# Patient Record
Sex: MALE | Race: WHITE | NOT HISPANIC OR LATINO | Employment: FULL TIME | URBAN - NONMETROPOLITAN AREA
[De-identification: names, ages, dates, MRNs, and addresses within clinical notes are randomized per-mention and may not be internally consistent; named-entity substitution may affect disease eponyms.]

---

## 2020-04-10 ENCOUNTER — OFFICE VISIT (OUTPATIENT)
Dept: URGENT CARE | Facility: PHYSICIAN GROUP | Age: 25
End: 2020-04-10
Payer: COMMERCIAL

## 2020-04-10 VITALS
SYSTOLIC BLOOD PRESSURE: 122 MMHG | HEART RATE: 88 BPM | OXYGEN SATURATION: 98 % | WEIGHT: 206 LBS | DIASTOLIC BLOOD PRESSURE: 74 MMHG | TEMPERATURE: 98.9 F | HEIGHT: 71 IN | BODY MASS INDEX: 28.84 KG/M2

## 2020-04-10 DIAGNOSIS — T15.91XA FOREIGN BODY OF RIGHT EYE, INITIAL ENCOUNTER: ICD-10-CM

## 2020-04-10 PROCEDURE — 99203 OFFICE O/P NEW LOW 30 MIN: CPT | Performed by: NURSE PRACTITIONER

## 2020-04-10 SDOH — HEALTH STABILITY: MENTAL HEALTH: HOW OFTEN DO YOU HAVE A DRINK CONTAINING ALCOHOL?: NEVER

## 2020-04-10 ASSESSMENT — ENCOUNTER SYMPTOMS
BLURRED VISION: 0
PHOTOPHOBIA: 0
FEVER: 0
DOUBLE VISION: 0
EYE DISCHARGE: 0
FOREIGN BODY SENSATION: 1
HEARTBURN: 0
NAUSEA: 0
EYE ITCHING: 0
VOMITING: 0
DIZZINESS: 0
CHILLS: 0
EYE PAIN: 1
EYE REDNESS: 1
ABDOMINAL PAIN: 0

## 2020-04-10 ASSESSMENT — VISUAL ACUITY: OU: 1

## 2020-04-11 NOTE — PROGRESS NOTES
Subjective:     Agus Carmen  is a 24 y.o. male who presents for Foreign Body in Eye (x 1 day / RT eye )       Eye Problem    The right eye is affected. This is a new problem. The current episode started yesterday. The problem occurs constantly. The problem has been gradually worsening. The injury mechanism was a foreign body (Patient states he was driving with his windown down and something flew into his eye). The pain is at a severity of 5/10. The pain is moderate. There is no known exposure to pink eye. He does not wear contacts. Associated symptoms include eye redness and a foreign body sensation. Pertinent negatives include no blurred vision, eye discharge, double vision, fever, itching, nausea, photophobia, recent URI or vomiting. Associated symptoms comments: 24-year-old male reports urgent care for new problem.  States that he was driving with his window down yesterday and he felt something fly into his eye.  Upon closer examination he noticed that his eye is getting red and he noticed 2 dots in there right where his eye is irritated.  Patient denies any photophobia, dizziness, headache, nausea or vomiting.  Patient denies any changes in vision or discharge. He has tried nothing for the symptoms.     Review of Systems   Constitutional: Negative for chills, fever and malaise/fatigue.   Eyes: Positive for pain and redness. Negative for blurred vision, double vision, photophobia, discharge and itching.   Gastrointestinal: Negative for abdominal pain, heartburn, nausea and vomiting.   Neurological: Negative for dizziness.     History reviewed. No pertinent past medical history. History reviewed. No pertinent surgical history.   Social History     Socioeconomic History   • Marital status: Single     Spouse name: Not on file   • Number of children: Not on file   • Years of education: Not on file   • Highest education level: Not on file   Occupational History   • Not on file   Social Needs   •  "Financial resource strain: Not on file   • Food insecurity     Worry: Not on file     Inability: Not on file   • Transportation needs     Medical: Not on file     Non-medical: Not on file   Tobacco Use   • Smoking status: Never Smoker   • Smokeless tobacco: Never Used   • Tobacco comment: vape   Substance and Sexual Activity   • Alcohol use: Never     Frequency: Never   • Drug use: Never   • Sexual activity: Yes   Lifestyle   • Physical activity     Days per week: Not on file     Minutes per session: Not on file   • Stress: Not on file   Relationships   • Social connections     Talks on phone: Not on file     Gets together: Not on file     Attends Zoroastrian service: Not on file     Active member of club or organization: Not on file     Attends meetings of clubs or organizations: Not on file     Relationship status: Not on file   • Intimate partner violence     Fear of current or ex partner: Not on file     Emotionally abused: Not on file     Physically abused: Not on file     Forced sexual activity: Not on file   Other Topics Concern   • Not on file   Social History Narrative   • Not on file    Patient has no known allergies.     Objective:   /74   Pulse 88   Temp 37.2 °C (98.9 °F) (Temporal)   Ht 1.803 m (5' 11\")   Wt 93.4 kg (206 lb)   SpO2 98%   BMI 28.73 kg/m²   Physical Exam  Vitals signs reviewed.   Constitutional:       Appearance: Normal appearance.   Eyes:      General: Lids are normal. Vision grossly intact. Gaze aligned appropriately.         Right eye: Foreign body present. No discharge or hordeolum.      Extraocular Movements: Extraocular movements intact.      Conjunctiva/sclera:      Right eye: Right conjunctiva is injected. No exudate or hemorrhage.     Pupils: Pupils are equal, round, and reactive to light.      Visual Fields: Right eye visual fields normal and left eye visual fields normal.        Comments: No scleral or corneal abrasions appreciated   Cardiovascular:      Rate and " Rhythm: Normal rate and regular rhythm.      Heart sounds: Normal heart sounds.   Pulmonary:      Effort: Pulmonary effort is normal.      Breath sounds: Normal breath sounds.   Skin:     General: Skin is warm.   Neurological:      Mental Status: He is alert and oriented to person, place, and time.   Psychiatric:         Mood and Affect: Mood normal.         Behavior: Behavior normal.         Thought Content: Thought content normal.         Judgment: Judgment normal.          Assessment/Plan:     1. Foreign body of right eye, initial encounter    Discussed physical examination findings as well as patient presentation is consistent with a foreign body in the sclera of the eye.  Discussed with patient that I do not do foreign body removal of the eye.  Provided patient with local optometrist/ophthalmologist for further work-up and evaluation on Monday.  Also instructed patient that I did try to see if anybody was open in [place] but due to the current state of things a lot of clinics are closed.  Instructed patient that if he feels like his pain is getting worse or his vision is changing he needs to report to the emergency room for further work-up and evaluation    Supportive care, differential diagnoses, and indications for immediate follow-up discussed with patient.    Pathogenesis of diagnosis discussed including typical length and natural progression. Patient expresses understanding and agrees to plan.    Instructed patient to return to clinic for worsening symptoms or symptoms that persist for 7 to 10 days     Please note that this dictation was created using voice recognition software. I have made every reasonable attempt to correct obvious errors, but I expect that there are errors of grammar and possibly content that I did not discover before finalizing the note.

## 2022-04-27 ENCOUNTER — OFFICE VISIT (OUTPATIENT)
Dept: URGENT CARE | Facility: PHYSICIAN GROUP | Age: 27
End: 2022-04-27
Payer: COMMERCIAL

## 2022-04-27 VITALS
BODY MASS INDEX: 28.31 KG/M2 | RESPIRATION RATE: 12 BRPM | HEART RATE: 85 BPM | WEIGHT: 209 LBS | TEMPERATURE: 98.6 F | OXYGEN SATURATION: 98 % | DIASTOLIC BLOOD PRESSURE: 84 MMHG | SYSTOLIC BLOOD PRESSURE: 122 MMHG | HEIGHT: 72 IN

## 2022-04-27 DIAGNOSIS — J34.89 NOSE PAIN: ICD-10-CM

## 2022-04-27 DIAGNOSIS — L73.9 NASAL FOLLICULITIS: ICD-10-CM

## 2022-04-27 PROCEDURE — 99213 OFFICE O/P EST LOW 20 MIN: CPT | Performed by: PHYSICIAN ASSISTANT

## 2022-04-27 RX ORDER — MULTIVIT WITH MINERALS/LUTEIN
TABLET ORAL
COMMUNITY
End: 2023-02-16

## 2022-04-27 RX ORDER — SULFAMETHOXAZOLE AND TRIMETHOPRIM 800; 160 MG/1; MG/1
1 TABLET ORAL 2 TIMES DAILY
Qty: 20 TABLET | Refills: 0 | Status: SHIPPED | OUTPATIENT
Start: 2022-04-27 | End: 2022-05-02

## 2022-04-27 NOTE — PROGRESS NOTES
Subjective:   Agus Carmen is a 26 y.o. male who presents for Foreign Body in Nose (X 1 week, R nostril pt states it feels very dry, irritation and has redness all over, thinks it is an infection)   Right sided nasal pain x 1 week.  Could see some redness. Intermittent runny nose.  No obvious URI symptoms.  No fever/chills.  Mild bleeding with irritation, no epistaxis. No seasonal allergies.  No nasal steroid usage.  Did have frequent epistaxis as a kid but nothing in recent years.        Medications:  Vitamin C Tabs    Allergies:             Patient has no known allergies.    Surgical History:       No past surgical history on file.    Past Social Hx:  Agus Carmen  reports that he has never smoked. He has never used smokeless tobacco. He reports current drug use. Drug: Marijuana. He reports that he does not drink alcohol.     Past Family Hx:   Agus Carmen family history includes Diabetes in his father; Heart Disease in his father and mother.       Problem list, medications, and allergies reviewed by myself today in Epic.     Objective:     /84 (BP Location: Left arm, Patient Position: Sitting, BP Cuff Size: Adult)   Pulse 85   Temp 37 °C (98.6 °F) (Temporal)   Resp 12   Ht 1.829 m (6')   Wt 94.8 kg (209 lb)   SpO2 98%   BMI 28.35 kg/m²     Physical Exam  Vitals and nursing note reviewed.   Constitutional:       General: He is not in acute distress.     Appearance: Normal appearance. He is not ill-appearing.   HENT:      Head: Normocephalic.      Nose: Nasal tenderness and congestion present. No nasal deformity.      Right Nostril: No foreign body, epistaxis, septal hematoma or occlusion.      Right Turbinates: Enlarged and swollen.      Right Sinus: No maxillary sinus tenderness or frontal sinus tenderness.   Eyes:      Extraocular Movements: Extraocular movements intact.      Pupils: Pupils are equal, round, and reactive to light.   Cardiovascular:      Rate  and Rhythm: Normal rate.   Pulmonary:      Effort: Pulmonary effort is normal.   Skin:     General: Skin is warm.      Findings: No rash.   Neurological:      Mental Status: He is alert and oriented to person, place, and time.   Psychiatric:         Thought Content: Thought content normal.         Judgment: Judgment normal.         Assessment/Plan:     Diagnosis and Associated Orders:     1. Nose pain    2. Nasal folliculitis  - sulfamethoxazole-trimethoprim (BACTRIM DS) 800-160 MG tablet; Take 1 Tablet by mouth 2 times a day for 5 days.  Dispense: 20 Tablet; Refill: 0  - Referral to ENT            Comments/MDM:  Antibiotics as above.  Warm compresses to nares 3-5 times per day.  -May take longer acting antihistamine for seasonal allergy symptoms as needed   -May use saline nasal spray for nasal congestion or to prevent epistaxis due to nasal passage dryness or allergies  -Monitor for continued epistaxis, headache, dizziness, weakness, hematemesis- need re-evaluation immediately, must go to ER, call 911  -Treat for nasal congestion as needed: avoid strong nose blowing, use saline nasal spray to loosen, moisten and cleanse dry mucus membranes before gently bowing nose   -May use vaseline on both nostrils at least twice per day. Increase water intake, avoid touching nose too hard or picking at nose. Use a humidifier as much as possible. If worsening of symptoms, will consider referral to ENT.    Advised to follow-up with increasing pain, facial redness, signs of cellulitis, increased signs of sinus pain or pressure.  Patient demonstrates understanding.  No questions are answered.      I personally reviewed prior external notes and test results pertinent to today's visit.  Red flags discussed as well as indications to present to the Emergency Department.  Supportive care, natural history, differential diagnoses, and indications for immediate follow-up discussed.  Patient expresses understanding and agrees to plan.   Patient denies any other questions or concerns.    Follow-up with the primary care physician for recheck, reevaluation, and consideration of further management.      Please note that this dictation was created using voice recognition software. I have made a reasonable attempt to correct obvious errors, but I expect that there are errors of grammar and possibly content that I did not discover before finalizing the note.    This note was electronically signed by Courtney Salmeron PA-C

## 2023-02-01 ENCOUNTER — OFFICE VISIT (OUTPATIENT)
Dept: URGENT CARE | Facility: PHYSICIAN GROUP | Age: 28
End: 2023-02-01
Payer: COMMERCIAL

## 2023-02-01 VITALS
WEIGHT: 225 LBS | BODY MASS INDEX: 30.48 KG/M2 | TEMPERATURE: 97.9 F | DIASTOLIC BLOOD PRESSURE: 78 MMHG | RESPIRATION RATE: 18 BRPM | OXYGEN SATURATION: 96 % | HEIGHT: 72 IN | HEART RATE: 84 BPM | SYSTOLIC BLOOD PRESSURE: 120 MMHG

## 2023-02-01 DIAGNOSIS — H02.849 SWELLING OF EYELID, UNSPECIFIED LATERALITY: ICD-10-CM

## 2023-02-01 DIAGNOSIS — Z91.09 ENVIRONMENTAL ALLERGIES: ICD-10-CM

## 2023-02-01 PROCEDURE — 99213 OFFICE O/P EST LOW 20 MIN: CPT | Performed by: PHYSICIAN ASSISTANT

## 2023-02-01 RX ORDER — FLUTICASONE PROPIONATE 50 MCG
1 SPRAY, SUSPENSION (ML) NASAL DAILY
Qty: 16 G | Refills: 0 | Status: SHIPPED | OUTPATIENT
Start: 2023-02-01 | End: 2023-06-07

## 2023-02-01 RX ORDER — METHYLPREDNISOLONE 4 MG/1
4 TABLET ORAL DAILY
Qty: 21 TABLET | Refills: 0 | Status: SHIPPED | OUTPATIENT
Start: 2023-02-01 | End: 2023-02-16

## 2023-02-01 ASSESSMENT — ENCOUNTER SYMPTOMS
SORE THROAT: 0
FEVER: 0
DIARRHEA: 0
DIZZINESS: 0
NAUSEA: 0
DIAPHORESIS: 0
EYE REDNESS: 0
VOMITING: 0
EYE PAIN: 0
COUGH: 0
CONSTIPATION: 0
SINUS PAIN: 0
HEADACHES: 0
EYE DISCHARGE: 0
CHILLS: 0
ABDOMINAL PAIN: 0
SHORTNESS OF BREATH: 0
WHEEZING: 0

## 2023-02-01 NOTE — PROGRESS NOTES
Subjective:     Agus Carmen  is a 27 y.o. male who presents for Sinus Problem (Sinuses got itchy and started swelling, swollen eyes)        he presents today with sinus congestion, itchy eyes, swelling of bilateral upper and lower eyelids and mild conjunctival injection.  Symptoms have been ongoing over the last few days.  Patient notes that this is a recurrent issue for him and he does related to a possible environmental allergy.  Has been taking daily antihistamine medications over the last several weeks.  Denies swelling of the lips/tongue/oropharynx, no difficulties with breathing.  He is requesting referral to primary care to undergo allergy testing.     Review of Systems   Constitutional:  Negative for chills, diaphoresis, fever and malaise/fatigue.   HENT:  Negative for congestion, ear discharge, sinus pain and sore throat.    Eyes:  Negative for pain, discharge and redness.        Swelling of bilateral upper and lower eyelids   Respiratory:  Negative for cough, shortness of breath and wheezing.    Cardiovascular:  Negative for chest pain.   Gastrointestinal:  Negative for abdominal pain, constipation, diarrhea, nausea and vomiting.   Neurological:  Negative for dizziness and headaches.    No Known Allergies  History reviewed. No pertinent past medical history.     Objective:   /78   Pulse 84   Temp 36.6 °C (97.9 °F) (Temporal)   Resp 18   Ht 1.829 m (6')   Wt 102 kg (225 lb)   SpO2 96%   BMI 30.52 kg/m²   Physical Exam  Vitals and nursing note reviewed.   Constitutional:       General: He is not in acute distress.     Appearance: Normal appearance. He is not ill-appearing, toxic-appearing or diaphoretic.   HENT:      Head: Normocephalic.      Right Ear: Tympanic membrane, ear canal and external ear normal. There is no impacted cerumen.      Left Ear: Tympanic membrane, ear canal and external ear normal. There is no impacted cerumen.      Nose: No congestion or rhinorrhea.       Mouth/Throat:      Mouth: Mucous membranes are moist.      Pharynx: No oropharyngeal exudate or posterior oropharyngeal erythema.   Eyes:      General:         Right eye: No discharge.         Left eye: No discharge.      Extraocular Movements: Extraocular movements intact.      Pupils: Pupils are equal, round, and reactive to light.      Comments: Mild conjunctival injection bilaterally.  Mild swelling over bilateral upper and lower eyelids, no tenderness to palpation and no erythema of the swollen eyelids.   Cardiovascular:      Rate and Rhythm: Normal rate and regular rhythm.   Pulmonary:      Effort: Pulmonary effort is normal. No respiratory distress.      Breath sounds: Normal breath sounds. No stridor. No wheezing or rhonchi.   Musculoskeletal:      Cervical back: Neck supple.   Lymphadenopathy:      Cervical: No cervical adenopathy.   Neurological:      General: No focal deficit present.      Mental Status: He is alert and oriented to person, place, and time.   Psychiatric:         Mood and Affect: Mood normal.         Behavior: Behavior normal.         Thought Content: Thought content normal.         Judgment: Judgment normal.           Diagnostic testing: None    Assessment/Plan:     Encounter Diagnoses   Name Primary?    Environmental allergies     Swelling of eyelid, unspecified laterality           Plan for care for today's complaint includes Medrol Dosepak and intranasal Flonase for acute exacerbation of patient's allergies.  Continue over-the-counter oral antihistamine medication daily for additional symptom support.  Referral sent to primary care.  No evidence of angioedema or oropharynx swelling on exam today, no abnormal breath sounds on lung auscultation today.  Vital signs are stable and patient is well-appearing.  Continue to monitor symptoms and return to urgent care or follow-up with primary care provider if symptoms remain ongoing.  Follow-up in the emergency department if symptoms become  severe, ER precautions discussed in office today..  Prescription for Medrol Dosepak, intranasal Flonase provided.    See AVS Instructions below for written guidance provided to patient on after-visit management and care in addition to our verbal discussion during the visit.    Please note that this dictation was created using voice recognition software. I have attempted to correct all errors, but there may be sound-alike, spelling, grammar and possibly content errors that I did not discover before finalizing the note.    Chavez Triplett PA-C

## 2023-02-15 ENCOUNTER — HOSPITAL ENCOUNTER (OUTPATIENT)
Dept: LAB | Facility: MEDICAL CENTER | Age: 28
End: 2023-02-15
Attending: NURSE PRACTITIONER
Payer: COMMERCIAL

## 2023-02-15 ENCOUNTER — OFFICE VISIT (OUTPATIENT)
Dept: MEDICAL GROUP | Facility: PHYSICIAN GROUP | Age: 28
End: 2023-02-15
Attending: PHYSICIAN ASSISTANT
Payer: COMMERCIAL

## 2023-02-15 VITALS
TEMPERATURE: 97.9 F | BODY MASS INDEX: 32.51 KG/M2 | HEIGHT: 72 IN | HEART RATE: 72 BPM | SYSTOLIC BLOOD PRESSURE: 116 MMHG | OXYGEN SATURATION: 98 % | DIASTOLIC BLOOD PRESSURE: 70 MMHG | WEIGHT: 240 LBS

## 2023-02-15 DIAGNOSIS — Z76.89 ENCOUNTER TO ESTABLISH CARE: ICD-10-CM

## 2023-02-15 DIAGNOSIS — Z23 NEED FOR VACCINATION: ICD-10-CM

## 2023-02-15 DIAGNOSIS — T78.40XA ALLERGY, INITIAL ENCOUNTER: ICD-10-CM

## 2023-02-15 DIAGNOSIS — Z11.3 ROUTINE SCREENING FOR STI (SEXUALLY TRANSMITTED INFECTION): ICD-10-CM

## 2023-02-15 DIAGNOSIS — E66.9 OBESITY (BMI 30-39.9): ICD-10-CM

## 2023-02-15 DIAGNOSIS — Z72.0 TOBACCO CHEW USE: ICD-10-CM

## 2023-02-15 LAB
C TRACH DNA SPEC QL NAA+PROBE: NEGATIVE
HIV 1+2 AB+HIV1 P24 AG SERPL QL IA: NORMAL
N GONORRHOEA DNA SPEC QL NAA+PROBE: NEGATIVE
SPECIMEN SOURCE: NORMAL
T PALLIDUM AB SER QL IA: NORMAL

## 2023-02-15 PROCEDURE — 87491 CHLMYD TRACH DNA AMP PROBE: CPT

## 2023-02-15 PROCEDURE — 87591 N.GONORRHOEAE DNA AMP PROB: CPT

## 2023-02-15 PROCEDURE — 87389 HIV-1 AG W/HIV-1&-2 AB AG IA: CPT

## 2023-02-15 PROCEDURE — 36415 COLL VENOUS BLD VENIPUNCTURE: CPT

## 2023-02-15 PROCEDURE — 90715 TDAP VACCINE 7 YRS/> IM: CPT | Performed by: PHYSICIAN ASSISTANT

## 2023-02-15 PROCEDURE — 99213 OFFICE O/P EST LOW 20 MIN: CPT | Mod: 25 | Performed by: NURSE PRACTITIONER

## 2023-02-15 PROCEDURE — 86780 TREPONEMA PALLIDUM: CPT

## 2023-02-15 PROCEDURE — 90471 IMMUNIZATION ADMIN: CPT | Performed by: PHYSICIAN ASSISTANT

## 2023-02-15 ASSESSMENT — ENCOUNTER SYMPTOMS
EYES NEGATIVE: 1
GASTROINTESTINAL NEGATIVE: 1
PALPITATIONS: 0
SHORTNESS OF BREATH: 0
COUGH: 0
MUSCULOSKELETAL NEGATIVE: 1
CONSTITUTIONAL NEGATIVE: 1
SPUTUM PRODUCTION: 0
PSYCHIATRIC NEGATIVE: 1
FEVER: 0
NEUROLOGICAL NEGATIVE: 1

## 2023-02-15 ASSESSMENT — PATIENT HEALTH QUESTIONNAIRE - PHQ9: CLINICAL INTERPRETATION OF PHQ2 SCORE: 0

## 2023-02-15 NOTE — ASSESSMENT & PLAN NOTE
Itchy eyes, stuffy nose started around 11/2022; reports no changes in lotion, soaps, laundry detergent etc. Vapes marijuana, chews tobacco. No relief with daily claritin x2 months. Got medrol dose pack 2/1/2023, had 1 more occurrence of itchy skin since then, took benadryl. Cause still uncertain.  - switch daily antihistamine to zyrtec or allegra  - can use prn flonase for stuffy nose  - recommend vaping cessation  - referral to allergist for further testing

## 2023-02-15 NOTE — ASSESSMENT & PLAN NOTE
28 y/o healthy male here to establish care. Seems he has developed some sort of allergy the past few months, cause is unclear at this time. States he has not changed anything recently in his usual daily routine. He does vape marijuana and he will check this for changes in ingredients. Referral placed to allergist for further testing. In interim symptoms calmed down after medrol dose back; I have asked him to switch antihistamine to either zyrtec or allegra since he did not have relief with claritin. Can continue prn flonase.   He would also like STD screen this year; he is sexually active with females. No penile, urinary symptoms reported today.

## 2023-02-15 NOTE — PROGRESS NOTES
Subjective       CC:    Chief Complaint   Patient presents with    Establish Care     Referral to allergy, routine blood work.         HISTORY OF THE PRESENT ILLNESS: Patient is a 27 y.o. male, here today to establish care. Prior PCP was none in the recent past. He reports no chronic medical history and is not currently on prescription medication. Relocated from [place] in 2019. He works with facility maintenance.     Active concern today is since 11/2022, he has noticed off/on itchy eyes, stuffy nose. He tried daily claritin for at least 2 months with no relief. He was seen in  on 2/1/2023 & completed steroid pack. Since then has had only 1 occurrence of itchy skin and he took benadryl for this. He would like to see allergist for further testing. We reviewed possible causes of allergic reactions including soaps, lotions, laundry detergents, pets, food. States he had no changes in these items prior to reaction. He does vape marijuana and chew tobacco. He will check these products to see if any changes in ingredients. Counseled today on complete cessation of vaping, tobacco use.     The below problems were discussed/reviewed at this visit:    Problem   Obesity (Bmi 30-39.9)   Tobacco Chew Use   Encounter to Establish Care   Allergies       Patient Active Problem List   Diagnosis    Obesity (BMI 30-39.9)    Tobacco chew use    Encounter to establish care    Allergies       History reviewed. No pertinent past medical history.     Current Outpatient Medications Ordered in Epic   Medication Sig Dispense Refill    fluticasone (FLONASE) 50 MCG/ACT nasal spray Administer 1 Spray into affected nostril(S) every day. 16 g 0    methylPREDNISolone (MEDROL DOSEPAK) 4 MG Tablet Therapy Pack Take 1 Tablet by mouth every day. Follow schedule on package instructions. (Patient not taking: Reported on 2/15/2023) 21 Tablet 0    Ascorbic Acid (VITAMIN C) 1000 MG Tab Take  by mouth. (Patient not taking: Reported on 2/15/2023)       No  current Epic-ordered facility-administered medications on file.        History reviewed. No pertinent surgical history.     Allergies:  Patient has no known allergies.    Health Maintenance: Completed  - he will get vaccine records from mom    ROS:   Review of Systems   Constitutional: Negative.  Negative for fever and malaise/fatigue.   HENT: Negative.     Eyes: Negative.    Respiratory:  Negative for cough, sputum production and shortness of breath.    Cardiovascular:  Negative for chest pain, palpitations and leg swelling.   Gastrointestinal: Negative.    Genitourinary: Negative.    Musculoskeletal: Negative.    Neurological: Negative.    Endo/Heme/Allergies: Negative.    Psychiatric/Behavioral: Negative.         Objective       Exam: /70   Pulse 72   Temp 36.6 °C (97.9 °F) (Tympanic)   Ht 1.829 m (6')   Wt 109 kg (240 lb)   SpO2 98%  Body mass index is 32.55 kg/m².    Physical Exam  Constitutional:       Appearance: Normal appearance.   Eyes:      Extraocular Movements: Extraocular movements intact.      Conjunctiva/sclera: Conjunctivae normal.      Pupils: Pupils are equal, round, and reactive to light.   Cardiovascular:      Rate and Rhythm: Normal rate and regular rhythm.      Pulses: Normal pulses.      Heart sounds: Normal heart sounds.   Pulmonary:      Effort: Pulmonary effort is normal.      Breath sounds: Normal breath sounds.   Musculoskeletal:         General: Normal range of motion.      Cervical back: Normal range of motion and neck supple.      Right lower leg: No edema.      Left lower leg: No edema.   Skin:     General: Skin is warm and dry.   Neurological:      General: No focal deficit present.      Mental Status: He is alert and oriented to person, place, and time.   Psychiatric:         Mood and Affect: Mood normal.         Behavior: Behavior normal.         Thought Content: Thought content normal.         Judgment: Judgment normal.       Assessment & Plan     27 y.o. male with the  following -    Problem List Items Addressed This Visit       Obesity (BMI 30-39.9)     BMI 32.55  - he is active at work as maintenance  - he bikes with family in the summer months  - we discussed incorporating some exercise in the winter months as well for cardiovascular health         Relevant Orders    Patient identified as having weight management issue.  Appropriate orders and counseling given.    Tobacco chew use     Chewing tobacco since 2018; also vapes marijuana; smoked cigarettes in the past for a short while <1 year.  - counseled on cessation of tobacco use.   - risk of oral cancer reviewed with patient; recommend he get oral exam with dentist and teeth cleaning every 6-12 months         Encounter to establish care     26 y/o healthy male here to establish care. Seems he has developed some sort of allergy the past few months, cause is unclear at this time. States he has not changed anything recently in his usual daily routine. He does vape marijuana and he will check this for changes in ingredients. Referral placed to allergist for further testing. In interim symptoms calmed down after medrol dose back; I have asked him to switch antihistamine to either zyrtec or allegra since he did not have relief with claritin. Can continue prn flonase.   He would also like STD screen this year; he is sexually active with females. No penile, urinary symptoms reported today.          Allergies     Itchy eyes, stuffy nose started around 11/2022; reports no changes in lotion, soaps, laundry detergent etc. Vapes marijuana, chews tobacco. No relief with daily claritin x2 months. Got medrol dose pack 2/1/2023, had 1 more occurrence of itchy skin since then, took benadryl. Cause still uncertain.  - switch daily antihistamine to zyrtec or allegra  - can use prn flonase for stuffy nose  - recommend vaping cessation  - referral to allergist for further testing         Relevant Orders    Referral to Allergy     Other Visit  Diagnoses       Routine screening for STI (sexually transmitted infection)        Relevant Orders    Chlamydia/GC, PCR (Urine)    HIV AG/AB COMBO ASSAY SCREENING    T.PALLIDUM AB CARLOS (SCREENING)    Need for vaccination        I have placed the below orders and discussed them with an approved delegating provider.The MA is performing the below orders under the direction of Dr Cardozo.    Relevant Orders    Tdap Vaccine =>6YO IM          Educated in proper administration of medication(s) ordered today including safety, possible SE, risks, benefits, rationale and alternatives to therapy.     Return in about 6 months (around 8/15/2023) for Annual Visit.    Please note that this dictation was created using voice recognition software. I have made every reasonable attempt to correct obvious errors, but I expect that there are errors of grammar and possibly content that I did not discover before finalizing the note.

## 2023-02-15 NOTE — ASSESSMENT & PLAN NOTE
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

## 2023-02-15 NOTE — ASSESSMENT & PLAN NOTE
Chewing tobacco since 2018; also vapes marijuana; smoked cigarettes in the past for a short while <1 year.  - counseled on cessation of tobacco use.   - risk of oral cancer reviewed with patient; recommend he get oral exam with dentist and teeth cleaning every 6-12 months

## 2023-03-18 ENCOUNTER — HOSPITAL ENCOUNTER (OUTPATIENT)
Dept: LAB | Facility: MEDICAL CENTER | Age: 28
End: 2023-03-18
Attending: INTERNAL MEDICINE
Payer: COMMERCIAL

## 2023-03-18 LAB
ALBUMIN SERPL BCP-MCNC: 4.6 G/DL (ref 3.2–4.9)
ALBUMIN/GLOB SERPL: 1.4 G/DL
ALP SERPL-CCNC: 83 U/L (ref 30–99)
ALT SERPL-CCNC: 50 U/L (ref 2–50)
ANION GAP SERPL CALC-SCNC: 10 MMOL/L (ref 7–16)
AST SERPL-CCNC: 33 U/L (ref 12–45)
BASOPHILS # BLD AUTO: 0.5 % (ref 0–1.8)
BASOPHILS # BLD: 0.03 K/UL (ref 0–0.12)
BILIRUB SERPL-MCNC: 0.8 MG/DL (ref 0.1–1.5)
BUN SERPL-MCNC: 12 MG/DL (ref 8–22)
CALCIUM ALBUM COR SERPL-MCNC: 9.3 MG/DL (ref 8.5–10.5)
CALCIUM SERPL-MCNC: 9.8 MG/DL (ref 8.5–10.5)
CHLORIDE SERPL-SCNC: 104 MMOL/L (ref 96–112)
CO2 SERPL-SCNC: 25 MMOL/L (ref 20–33)
CREAT SERPL-MCNC: 1.13 MG/DL (ref 0.5–1.4)
EOSINOPHIL # BLD AUTO: 0.14 K/UL (ref 0–0.51)
EOSINOPHIL NFR BLD: 2.4 % (ref 0–6.9)
ERYTHROCYTE [DISTWIDTH] IN BLOOD BY AUTOMATED COUNT: 38.5 FL (ref 35.9–50)
GFR SERPLBLD CREATININE-BSD FMLA CKD-EPI: 92 ML/MIN/1.73 M 2
GLOBULIN SER CALC-MCNC: 3.2 G/DL (ref 1.9–3.5)
GLUCOSE SERPL-MCNC: 99 MG/DL (ref 65–99)
HCT VFR BLD AUTO: 48.1 % (ref 42–52)
HGB BLD-MCNC: 16.3 G/DL (ref 14–18)
IMM GRANULOCYTES # BLD AUTO: 0.03 K/UL (ref 0–0.11)
IMM GRANULOCYTES NFR BLD AUTO: 0.5 % (ref 0–0.9)
LYMPHOCYTES # BLD AUTO: 2.03 K/UL (ref 1–4.8)
LYMPHOCYTES NFR BLD: 34.5 % (ref 22–41)
MCH RBC QN AUTO: 28.4 PG (ref 27–33)
MCHC RBC AUTO-ENTMCNC: 33.9 G/DL (ref 33.7–35.3)
MCV RBC AUTO: 83.9 FL (ref 81.4–97.8)
MONOCYTES # BLD AUTO: 0.64 K/UL (ref 0–0.85)
MONOCYTES NFR BLD AUTO: 10.9 % (ref 0–13.4)
NEUTROPHILS # BLD AUTO: 3.02 K/UL (ref 1.82–7.42)
NEUTROPHILS NFR BLD: 51.2 % (ref 44–72)
NRBC # BLD AUTO: 0 K/UL
NRBC BLD-RTO: 0 /100 WBC
PLATELET # BLD AUTO: 303 K/UL (ref 164–446)
PMV BLD AUTO: 9.8 FL (ref 9–12.9)
POTASSIUM SERPL-SCNC: 4.4 MMOL/L (ref 3.6–5.5)
PROT SERPL-MCNC: 7.8 G/DL (ref 6–8.2)
RBC # BLD AUTO: 5.73 M/UL (ref 4.7–6.1)
SODIUM SERPL-SCNC: 139 MMOL/L (ref 135–145)
TSH SERPL DL<=0.005 MIU/L-ACNC: 2.22 UIU/ML (ref 0.38–5.33)
WBC # BLD AUTO: 5.9 K/UL (ref 4.8–10.8)

## 2023-03-18 PROCEDURE — 83520 IMMUNOASSAY QUANT NOS NONAB: CPT

## 2023-03-18 PROCEDURE — 85025 COMPLETE CBC W/AUTO DIFF WBC: CPT

## 2023-03-18 PROCEDURE — 80053 COMPREHEN METABOLIC PANEL: CPT

## 2023-03-18 PROCEDURE — 84443 ASSAY THYROID STIM HORMONE: CPT

## 2023-03-18 PROCEDURE — 36415 COLL VENOUS BLD VENIPUNCTURE: CPT

## 2023-03-21 LAB — TRYPTASE SERPL-MCNC: 5.5 UG/L

## 2023-06-07 ENCOUNTER — OFFICE VISIT (OUTPATIENT)
Dept: URGENT CARE | Facility: PHYSICIAN GROUP | Age: 28
End: 2023-06-07
Payer: COMMERCIAL

## 2023-06-07 VITALS
HEIGHT: 72 IN | OXYGEN SATURATION: 97 % | WEIGHT: 230 LBS | TEMPERATURE: 98.1 F | RESPIRATION RATE: 16 BRPM | HEART RATE: 90 BPM | BODY MASS INDEX: 31.15 KG/M2 | SYSTOLIC BLOOD PRESSURE: 126 MMHG | DIASTOLIC BLOOD PRESSURE: 72 MMHG

## 2023-06-07 DIAGNOSIS — J30.9 ALLERGIC RHINITIS, UNSPECIFIED SEASONALITY, UNSPECIFIED TRIGGER: ICD-10-CM

## 2023-06-07 DIAGNOSIS — H57.89 EYE SWELLING: ICD-10-CM

## 2023-06-07 DIAGNOSIS — H57.89 EYE IRRITATION: ICD-10-CM

## 2023-06-07 PROCEDURE — 3074F SYST BP LT 130 MM HG: CPT | Performed by: NURSE PRACTITIONER

## 2023-06-07 PROCEDURE — 3078F DIAST BP <80 MM HG: CPT | Performed by: NURSE PRACTITIONER

## 2023-06-07 PROCEDURE — 99214 OFFICE O/P EST MOD 30 MIN: CPT | Performed by: NURSE PRACTITIONER

## 2023-06-07 RX ORDER — METHYLPREDNISOLONE 4 MG/1
TABLET ORAL
Qty: 1 EACH | Refills: 0 | Status: SHIPPED | OUTPATIENT
Start: 2023-06-07 | End: 2023-08-22

## 2023-06-07 ASSESSMENT — ENCOUNTER SYMPTOMS
EYE DISCHARGE: 0
BLURRED VISION: 0
CONSTITUTIONAL NEGATIVE: 1
EYE REDNESS: 0
FEVER: 0
EYE ITCHING: 1

## 2023-06-07 ASSESSMENT — VISUAL ACUITY: OU: 1

## 2023-06-07 ASSESSMENT — FIBROSIS 4 INDEX: FIB4 SCORE: 0.39

## 2023-06-07 NOTE — LETTER
June 7, 2023         Patient: Agus Carmen   YOB: 1995   Date of Visit: 6/7/2023           To Whom it May Concern:    Agus Carmen was seen in my clinic on 6/7/2023 due to illness. Due to medical necessity, please excuse patient from work 6/7 and 6/8.     If you have any questions or concerns, please don't hesitate to call.        Sincerely,         Kike Soto A.P.R.N.  Electronically Signed

## 2023-06-07 NOTE — PROGRESS NOTES
Subjective:     Agus Carmen is a 27 y.o. male who presents for Eye Problem (BILATERAL EYE IRRITATION AND SWELLING X 1 YEAR )       Eye Problem   Both eyes are affected. This is a chronic problem. The problem has been waxing and waning. There was no injury mechanism. The pain is mild. Associated symptoms include itching. Pertinent negatives include no blurred vision, eye discharge, eye redness or fever.     Patient reports chronic, recurrent bilateral eye itching, irritation, and surrounding swelling.    Has also had sneezing, nasal congestion, and pruritus. Has taken Allegra which seems to help with his skin itching.  However, would reoccur when he stops it.    Tried seeing allergist.  Reports having had blood work.  Informed that results were unremarkable and he was advised to continue with OTC antihistamine.    Sees PCP.    Review of Systems   Constitutional: Negative.  Negative for fever.   HENT:  Positive for congestion.    Eyes:  Positive for itching. Negative for blurred vision, discharge and redness.        Irritation   Skin:  Positive for itching.   All other systems reviewed and are negative.    Refer to HPI for additional details.    During this visit, appropriate PPE was worn, and hand hygiene was performed.    PMH:  has no past medical history on file.    MEDS:   Current Outpatient Medications:     methylPREDNISolone (MEDROL DOSEPAK) 4 MG Tablet Therapy Pack, Use as directed on package. May take all of Day 1 as a single dose (24 mg) when starting., Disp: 1 Each, Rfl: 0    ALLERGIES: No Known Allergies  SURGHX: History reviewed. No pertinent surgical history.  SOCHX:  reports that he has never smoked. His smokeless tobacco use includes chew. He reports current alcohol use. He reports current drug use. Drugs: Marijuana and Inhaled.    FH: Per HPI as applicable/pertinent.      Objective:     /72   Pulse 90   Temp 36.7 °C (98.1 °F) (Temporal)   Resp 16   Ht 1.829 m (6')   Wt 104 kg  (230 lb)   SpO2 97%   BMI 31.19 kg/m²     Physical Exam  Nursing note reviewed.   Constitutional:       General: He is not in acute distress.     Appearance: He is well-developed. He is not ill-appearing or toxic-appearing.   Eyes:      General: Vision grossly intact.         Right eye: No discharge.         Left eye: No discharge.      Extraocular Movements: Extraocular movements intact.      Conjunctiva/sclera: Conjunctivae normal.      Right eye: Right conjunctiva is not injected. No chemosis.     Left eye: Left conjunctiva is not injected.      Pupils: Pupils are equal, round, and reactive to light.      Comments: Minimal swelling below left e ye   Cardiovascular:      Rate and Rhythm: Normal rate.   Pulmonary:      Effort: Pulmonary effort is normal. No respiratory distress.   Musculoskeletal:         General: No deformity. Normal range of motion.   Skin:     Coloration: Skin is not pale.   Neurological:      Mental Status: He is alert and oriented to person, place, and time.      Motor: No weakness.   Psychiatric:         Behavior: Behavior normal. Behavior is cooperative.       Assessment/Plan:     1. Eye swelling  - methylPREDNISolone (MEDROL DOSEPAK) 4 MG Tablet Therapy Pack; Use as directed on package. May take all of Day 1 as a single dose (24 mg) when starting.  Dispense: 1 Each; Refill: 0  - Referral to Ophthalmology    2. Eye irritation  - methylPREDNISolone (MEDROL DOSEPAK) 4 MG Tablet Therapy Pack; Use as directed on package. May take all of Day 1 as a single dose (24 mg) when starting.  Dispense: 1 Each; Refill: 0  - Referral to Ophthalmology    3. Allergic rhinitis, unspecified seasonality, unspecified trigger  - methylPREDNISolone (MEDROL DOSEPAK) 4 MG Tablet Therapy Pack; Use as directed on package. May take all of Day 1 as a single dose (24 mg) when starting.  Dispense: 1 Each; Refill: 0    Chronic, recurrent problem. Symptoms likely related to allergies. Rx as above sent electronically. Trial  of oral steroids. Monitor. Follow up ophthalmology for further evaluation; referral placed. Follow up with PCP.    Differential diagnosis, natural history, supportive care, over-the-counter symptom management per 's instructions, close monitoring, and indications for immediate follow-up discussed.     All questions answered. Patient agrees with the plan of care.    Work note provided.

## 2023-06-20 ENCOUNTER — PATIENT MESSAGE (OUTPATIENT)
Dept: MEDICAL GROUP | Facility: PHYSICIAN GROUP | Age: 28
End: 2023-06-20
Payer: COMMERCIAL

## 2023-06-20 DIAGNOSIS — T78.40XA ALLERGY, INITIAL ENCOUNTER: ICD-10-CM

## 2023-06-21 NOTE — PROGRESS NOTES
1. Allergy, initial encounter  Referral resubmitted for another group per patient request  - Referral to Allergy

## 2023-06-30 ENCOUNTER — HOSPITAL ENCOUNTER (OUTPATIENT)
Dept: LAB | Facility: MEDICAL CENTER | Age: 28
End: 2023-06-30
Attending: NURSE PRACTITIONER
Payer: COMMERCIAL

## 2023-06-30 ENCOUNTER — OFFICE VISIT (OUTPATIENT)
Dept: MEDICAL GROUP | Facility: PHYSICIAN GROUP | Age: 28
End: 2023-06-30
Payer: COMMERCIAL

## 2023-06-30 VITALS
SYSTOLIC BLOOD PRESSURE: 116 MMHG | HEART RATE: 80 BPM | HEIGHT: 72 IN | RESPIRATION RATE: 16 BRPM | OXYGEN SATURATION: 99 % | WEIGHT: 226 LBS | TEMPERATURE: 97 F | DIASTOLIC BLOOD PRESSURE: 72 MMHG | BODY MASS INDEX: 30.61 KG/M2

## 2023-06-30 DIAGNOSIS — R00.0 TACHYCARDIA: ICD-10-CM

## 2023-06-30 LAB
ALBUMIN SERPL BCP-MCNC: 5 G/DL (ref 3.2–4.9)
ALBUMIN/GLOB SERPL: 1.7 G/DL
ALP SERPL-CCNC: 102 U/L (ref 30–99)
ALT SERPL-CCNC: 38 U/L (ref 2–50)
ANION GAP SERPL CALC-SCNC: 8 MMOL/L (ref 7–16)
AST SERPL-CCNC: 30 U/L (ref 12–45)
BILIRUB SERPL-MCNC: 0.6 MG/DL (ref 0.1–1.5)
BUN SERPL-MCNC: 13 MG/DL (ref 8–22)
CALCIUM ALBUM COR SERPL-MCNC: 9.2 MG/DL (ref 8.5–10.5)
CALCIUM SERPL-MCNC: 10 MG/DL (ref 8.5–10.5)
CHLORIDE SERPL-SCNC: 102 MMOL/L (ref 96–112)
CO2 SERPL-SCNC: 27 MMOL/L (ref 20–33)
CREAT SERPL-MCNC: 1.06 MG/DL (ref 0.5–1.4)
ERYTHROCYTE [DISTWIDTH] IN BLOOD BY AUTOMATED COUNT: 37.6 FL (ref 35.9–50)
GFR SERPLBLD CREATININE-BSD FMLA CKD-EPI: 99 ML/MIN/1.73 M 2
GLOBULIN SER CALC-MCNC: 2.9 G/DL (ref 1.9–3.5)
GLUCOSE SERPL-MCNC: 98 MG/DL (ref 65–99)
HCT VFR BLD AUTO: 49.5 % (ref 42–52)
HGB BLD-MCNC: 16.6 G/DL (ref 14–18)
MCH RBC QN AUTO: 28.8 PG (ref 27–33)
MCHC RBC AUTO-ENTMCNC: 33.5 G/DL (ref 32.3–36.5)
MCV RBC AUTO: 85.9 FL (ref 81.4–97.8)
PLATELET # BLD AUTO: 294 K/UL (ref 164–446)
PMV BLD AUTO: 9.4 FL (ref 9–12.9)
POTASSIUM SERPL-SCNC: 4.8 MMOL/L (ref 3.6–5.5)
PROT SERPL-MCNC: 7.9 G/DL (ref 6–8.2)
RBC # BLD AUTO: 5.76 M/UL (ref 4.7–6.1)
SODIUM SERPL-SCNC: 137 MMOL/L (ref 135–145)
TROPONIN T SERPL-MCNC: <6 NG/L (ref 6–19)
WBC # BLD AUTO: 5.5 K/UL (ref 4.8–10.8)

## 2023-06-30 PROCEDURE — 93000 ELECTROCARDIOGRAM COMPLETE: CPT | Performed by: NURSE PRACTITIONER

## 2023-06-30 PROCEDURE — 80307 DRUG TEST PRSMV CHEM ANLYZR: CPT

## 2023-06-30 PROCEDURE — 84484 ASSAY OF TROPONIN QUANT: CPT

## 2023-06-30 PROCEDURE — 99214 OFFICE O/P EST MOD 30 MIN: CPT | Performed by: NURSE PRACTITIONER

## 2023-06-30 PROCEDURE — 80053 COMPREHEN METABOLIC PANEL: CPT

## 2023-06-30 PROCEDURE — 3078F DIAST BP <80 MM HG: CPT | Performed by: NURSE PRACTITIONER

## 2023-06-30 PROCEDURE — 36415 COLL VENOUS BLD VENIPUNCTURE: CPT

## 2023-06-30 PROCEDURE — 3074F SYST BP LT 130 MM HG: CPT | Performed by: NURSE PRACTITIONER

## 2023-06-30 PROCEDURE — 85027 COMPLETE CBC AUTOMATED: CPT

## 2023-06-30 ASSESSMENT — ENCOUNTER SYMPTOMS
FEVER: 0
NEUROLOGICAL NEGATIVE: 1
COUGH: 0
SPUTUM PRODUCTION: 0
CONSTITUTIONAL NEGATIVE: 1
GASTROINTESTINAL NEGATIVE: 1
PSYCHIATRIC NEGATIVE: 1
MUSCULOSKELETAL NEGATIVE: 1
PALPITATIONS: 1
EYES NEGATIVE: 1
SHORTNESS OF BREATH: 0

## 2023-06-30 ASSESSMENT — FIBROSIS 4 INDEX: FIB4 SCORE: 0.39

## 2023-06-30 NOTE — PROGRESS NOTES
Subjective       CC:   Chief Complaint   Patient presents with    Tachycardia     Rest, heart feels weird, past couple days, heart rate shot up to 220 BPM, Chest pain, feels like pressure on it, denies stress        HPI:   Patient is a 27 y.o. established male patient with medical history listed below here today with new concern of feeling flushed with lintermittent eft side chest pain for a few months. He got a heart monitor watch which shows 120-140 during the day, sometimes it goes as low as 40s. Seems to feel better with activity. He went hiking on Sunday and felt great, highest HR during incline climb was in 130s. No new stressors, he does not feel anxious. Denies SOB, dyspnea, dizziness, edema. He does not smoke cigarettes but does chew tobacco. He quit vaping marijuana months ago.     Patient Active Problem List   Diagnosis    Obesity (BMI 30-39.9)    Tobacco chew use    Encounter to establish care    Allergies    Tachycardia       History reviewed. No pertinent past medical history.     History reviewed. No pertinent surgical history.     Current Outpatient Medications on File Prior to Visit   Medication Sig Dispense Refill    methylPREDNISolone (MEDROL DOSEPAK) 4 MG Tablet Therapy Pack Use as directed on package. May take all of Day 1 as a single dose (24 mg) when starting. 1 Each 0     No current facility-administered medications on file prior to visit.        ROS:  Review of Systems   Constitutional: Negative.  Negative for fever and malaise/fatigue.   HENT: Negative.     Eyes: Negative.    Respiratory:  Negative for cough, sputum production and shortness of breath.    Cardiovascular:  Positive for chest pain and palpitations. Negative for leg swelling.   Gastrointestinal: Negative.    Genitourinary: Negative.    Musculoskeletal: Negative.    Neurological: Negative.    Endo/Heme/Allergies: Negative.    Psychiatric/Behavioral: Negative.         Objective       Exam:  /72   Pulse 80   Temp 36.1 °C  (97 °F) (Temporal)   Resp 16   Ht 1.829 m (6')   Wt 103 kg (226 lb)   SpO2 99%   BMI 30.65 kg/m²  Body mass index is 30.65 kg/m².    Physical Exam  Constitutional:       Appearance: Normal appearance.   Cardiovascular:      Rate and Rhythm: Normal rate and regular rhythm.      Pulses: Normal pulses.      Heart sounds: Normal heart sounds.   Pulmonary:      Effort: Pulmonary effort is normal.      Breath sounds: Normal breath sounds.   Musculoskeletal:      Right lower leg: No edema.      Left lower leg: No edema.   Skin:     General: Skin is warm and dry.   Neurological:      General: No focal deficit present.      Mental Status: He is alert and oriented to person, place, and time.       EKG Interpretation  Interpreted by myself    Measurements    QRSD  91  QT  396  QTc 422    Axis  P  23  QRS  52  T  19    Rhythm: normal sinus   Rate: 68  Axis: normal  Ectopy: premature atrial contraction  Conduction: prolonged CA interval  ST Segments: no acute change and normal  T Waves: no acute change and normal  Q Waves: none    Clinical Impression: no acute changes, SR, PAC, prolonged CA interval      Assessment & Plan       27 y.o. male with the following -     Problem List Items Addressed This Visit       Tachycardia     Reports feeling flushed with intermittent left side chest pressure for a few months. He got a heart monitor watch some days ago which shows -140 during the day, sometimes it goes as low as 40s. Seems to feel better with activity. He went hiking on Sunday and felt great, highest HR during incline climb was in 130s. No new stressors, he does not feel anxious. Denies cough, SOB, dyspnea, dizziness, edema. No abdominal pain, N/V/D. He does not smoke cigarettes but does chew tobacco. He quit vaping marijuana months ago when symptoms started but has not noticed improvement.   BS clear on exam, no murmur. BP today 116/72, HR 80; pt does not appear in distress. He does not have symptoms at this  moment.   - EKG today SR with PAC & prolonged CA interval, rate 68; no ST changes  - check CMP, CBC, trop, UDS  - referral to see card for holter monitor, may need stress test  - adequate hydration; avoid toxic substances. He understands to report to ER if chest pain is lingering, intensifies or is accompanied with other symptoms like SOB. I will follow up with results when available         Relevant Orders    EKG - Clinic Performed (Completed)    REFERRAL TO CARDIOLOGY    Comp Metabolic Panel    TROPONIN    CBC WITHOUT DIFFERENTIAL    URINE DRUG SCREEN       Educated in proper administration of medication(s) ordered today including safety, possible SE, risks, benefits, rationale and alternatives to therapy.     Return in about 4 weeks (around 7/28/2023) for tachycardia.    Please note that this dictation was created using voice recognition software. I have made every reasonable attempt to correct obvious errors, but I expect that there are errors of grammar and possibly content that I did not discover before finalizing the note.

## 2023-06-30 NOTE — ASSESSMENT & PLAN NOTE
Reports feeling flushed with intermittent left side chest pressure for a few months. He got a heart monitor watch some days ago which shows -140 during the day, sometimes it goes as low as 40s. Seems to feel better with activity. He went hiking on Sunday and felt great, highest HR during incline climb was in 130s. No new stressors, he does not feel anxious. Denies cough, SOB, dyspnea, dizziness, edema. No abdominal pain, N/V/D. He does not smoke cigarettes but does chew tobacco. He quit vaping marijuana months ago when symptoms started but has not noticed improvement.   BS clear on exam, no murmur. BP today 116/72, HR 80; pt does not appear in distress. He does not have symptoms at this moment.   - EKG today SR with PAC & prolonged CT interval, rate 68; no ST changes  - check CMP, CBC, trop, UDS  - referral to see card for holter monitor, may need stress test  - adequate hydration; avoid toxic substances. He understands to report to ER if chest pain is lingering, intensifies or is accompanied with other symptoms like SOB. I will follow up with results when available

## 2023-07-01 LAB
AMPHETAMINES UR QL: NEGATIVE NG/ML
BARBITURATES UR QL: NEGATIVE NG/ML
BENZODIAZ UR QL: NEGATIVE NG/ML
CANNABINOIDS UR QL SCN: NEGATIVE NG/ML
COCAINE UR QL: NEGATIVE NG/ML
DRUG SCREEN COMMENT UR-IMP: NORMAL
MDMA CTO UR SCN-MCNC: NEGATIVE NG/ML
METHADONE UR QL: NEGATIVE NG/ML
OPIATES UR QL: NEGATIVE NG/ML
OXYCODONE CTO UR SCN-MCNC: NEGATIVE NG/ML
PCP UR QL SCN: NEGATIVE NG/ML
PROPOXYPH UR QL: NEGATIVE NG/ML

## 2023-07-04 DIAGNOSIS — Z13.228 SCREENING FOR METABOLIC DISORDER: ICD-10-CM

## 2023-07-04 DIAGNOSIS — R74.8 ELEVATED ALKALINE PHOSPHATASE LEVEL: ICD-10-CM

## 2023-07-05 NOTE — PROGRESS NOTES
1. Screening for metabolic disorder  - Lipid Profile; Future  - HEMOGLOBIN A1C; Future    2. Elevated alkaline phosphatase level  - Lipid Profile; Future

## 2023-08-08 ENCOUNTER — TELEPHONE (OUTPATIENT)
Dept: CARDIOLOGY | Facility: MEDICAL CENTER | Age: 28
End: 2023-08-08
Payer: COMMERCIAL

## 2023-08-08 NOTE — TELEPHONE ENCOUNTER
Spoke to patient in regards to records for NP appointment with Dr. Roger on on 8/22/23 at 820AM.    Patient has seen a cardiologist before?                If yes, where?: No    Any recent cardiac testing outside of [facility]?                What testing:No              Where was it completed?:N/A    Were any records requested?  No              Fax: N/A

## 2023-08-22 ENCOUNTER — OFFICE VISIT (OUTPATIENT)
Dept: CARDIOLOGY | Facility: MEDICAL CENTER | Age: 28
End: 2023-08-22
Attending: NURSE PRACTITIONER
Payer: COMMERCIAL

## 2023-08-22 ENCOUNTER — NON-PROVIDER VISIT (OUTPATIENT)
Dept: CARDIOLOGY | Facility: MEDICAL CENTER | Age: 28
End: 2023-08-22
Attending: INTERNAL MEDICINE
Payer: COMMERCIAL

## 2023-08-22 VITALS
BODY MASS INDEX: 32.23 KG/M2 | OXYGEN SATURATION: 98 % | SYSTOLIC BLOOD PRESSURE: 124 MMHG | HEIGHT: 72 IN | DIASTOLIC BLOOD PRESSURE: 84 MMHG | RESPIRATION RATE: 18 BRPM | HEART RATE: 74 BPM | WEIGHT: 238 LBS

## 2023-08-22 DIAGNOSIS — R00.0 TACHYCARDIA: ICD-10-CM

## 2023-08-22 DIAGNOSIS — Z82.49 FAMILY HISTORY OF PREMATURE CAD: ICD-10-CM

## 2023-08-22 PROCEDURE — 99204 OFFICE O/P NEW MOD 45 MIN: CPT | Performed by: INTERNAL MEDICINE

## 2023-08-22 PROCEDURE — 3074F SYST BP LT 130 MM HG: CPT | Performed by: INTERNAL MEDICINE

## 2023-08-22 PROCEDURE — 3079F DIAST BP 80-89 MM HG: CPT | Performed by: INTERNAL MEDICINE

## 2023-08-22 PROCEDURE — 99211 OFF/OP EST MAY X REQ PHY/QHP: CPT | Performed by: INTERNAL MEDICINE

## 2023-08-22 ASSESSMENT — ENCOUNTER SYMPTOMS
WEIGHT LOSS: 0
COUGH: 0
PALPITATIONS: 0
WEIGHT GAIN: 0
BACK PAIN: 0
SYNCOPE: 0
DEPRESSION: 0
DECREASED APPETITE: 0
DIARRHEA: 0
FEVER: 0
ABDOMINAL PAIN: 0
FLANK PAIN: 0
DIZZINESS: 0
NEAR-SYNCOPE: 0
NAUSEA: 0
SHORTNESS OF BREATH: 0
ALTERED MENTAL STATUS: 0
DYSPNEA ON EXERTION: 0
HEARTBURN: 0
CONSTIPATION: 0
VOMITING: 0
IRREGULAR HEARTBEAT: 0
ORTHOPNEA: 0
BLURRED VISION: 0
CLAUDICATION: 0
PND: 0

## 2023-08-22 ASSESSMENT — FIBROSIS 4 INDEX: FIB4 SCORE: 0.41

## 2023-08-22 NOTE — PROGRESS NOTES
[facility] 10/03/23 & 11/01/23 called mail box full    Home enrollment completed in the 7 day ePatch Holter monitoring program per Orestes Roger MD. Monitor will be shipped to patient via [employer]. Pending EOS.

## 2023-08-22 NOTE — PROGRESS NOTES
"Cardiology Note    tachycardia    History of Present Illness: Agus Carmen is a 27 y.o. male who presents for initial visit. Referred by Domi Waldron DNP for tachycardia.     Describes started wearing heart rate monitor. G shock fit. On one occasion noted heart rate in 220s. Has had other high rates but that was highest. Associated with body tense feeling when palpitations occur. States separate issue of chest pain. Feels chest tightness claims lasts about an hour at a time. Occurs almost daily. Started about a year ago. Initially about weekly and now more frequent. In addition has separate type of chest pain which is a rapid \"like lightning strike\" which often associated with lifting object. Works at a [employer]; lifting, moving material, up and down ladders. This is also positional and states worse when laying on left side at night. Describes usually very active; hikes and rides bike on weekends and about 15k steps at work. Quit cigarettes now using nicotine supplements; off tobacco three years and on nicotine supplements since. He uses pouches and takes about 7x 6mg pouches daily. Also consumes about 200mcg caffeine daily he estimates. Father with premature coronary disease. No other cardiac complaints. Denies snoring/apnea. No other toxic social habits.     Review of Systems   Constitutional: Negative for decreased appetite, fever, malaise/fatigue, weight gain and weight loss.   HENT:  Negative for congestion and nosebleeds.    Eyes:  Negative for blurred vision.   Cardiovascular:  Negative for chest pain, claudication, dyspnea on exertion, irregular heartbeat, leg swelling, near-syncope, orthopnea, palpitations, paroxysmal nocturnal dyspnea and syncope.   Respiratory:  Negative for cough and shortness of breath.    Endocrine: Negative for cold intolerance and heat intolerance.   Skin:  Negative for rash.   Musculoskeletal:  Negative for back pain.   Gastrointestinal:  Negative for abdominal " pain, constipation, diarrhea, heartburn, melena, nausea and vomiting.   Genitourinary:  Negative for dysuria, flank pain and hematuria.   Neurological:  Negative for dizziness.   Psychiatric/Behavioral:  Negative for altered mental status and depression.          History reviewed. No pertinent past medical history.      History reviewed. No pertinent surgical history.      Current Outpatient Medications   Medication Sig Dispense Refill    Fexofenadine HCl (ALLEGRA ALLERGY PO) Take 1 Tablet by mouth every day.      methylPREDNISolone (MEDROL DOSEPAK) 4 MG Tablet Therapy Pack Use as directed on package. May take all of Day 1 as a single dose (24 mg) when starting. (Patient not taking: Reported on 8/22/2023) 1 Each 0     No current facility-administered medications for this visit.         No Known Allergies      Family History   Problem Relation Age of Onset    Heart Disease Mother     Diabetes Father     Heart Disease Father          Social History     Socioeconomic History    Marital status: Single     Spouse name: Not on file    Number of children: Not on file    Years of education: Not on file    Highest education level: Not on file   Occupational History    Not on file   Tobacco Use    Smoking status: Never    Smokeless tobacco: Current     Types: Chew   Vaping Use    Vaping Use: Every day    Substances: Nicotine   Substance and Sexual Activity    Alcohol use: Yes    Drug use: Not Currently     Types: Marijuana, Inhaled     Comment: occ    Sexual activity: Yes     Partners: Female   Other Topics Concern    Not on file   Social History Narrative    Not on file     Social Determinants of Health     Financial Resource Strain: Not on file   Food Insecurity: Not on file   Transportation Needs: Not on file   Physical Activity: Not on file   Stress: Not on file   Social Connections: Not on file   Intimate Partner Violence: Not on file   Housing Stability: Not on file         Physical Exam:  Ambulatory Vitals  /84  "(BP Location: Left arm, Patient Position: Sitting, BP Cuff Size: Adult)   Pulse 74   Resp 18   Ht 1.829 m (6')   Wt 108 kg (238 lb)   SpO2 98%    BP Readings from Last 4 Encounters:   08/22/23 124/84   06/30/23 116/72   06/07/23 126/72   02/15/23 116/70     Weight/BMI:   Vitals:    08/22/23 0755   BP: 124/84   Weight: 108 kg (238 lb)   Height: 1.829 m (6')    Body mass index is 32.28 kg/m².  Wt Readings from Last 4 Encounters:   08/22/23 108 kg (238 lb)   06/30/23 103 kg (226 lb)   06/07/23 104 kg (230 lb)   02/15/23 109 kg (240 lb)       Physical Exam  Constitutional:       General: He is not in acute distress.  HENT:      Head: Normocephalic and atraumatic.   Eyes:      Conjunctiva/sclera: Conjunctivae normal.      Pupils: Pupils are equal, round, and reactive to light.   Neck:      Vascular: No JVD.   Cardiovascular:      Rate and Rhythm: Normal rate and regular rhythm.      Heart sounds: Normal heart sounds. No murmur heard.     No friction rub. No gallop.   Pulmonary:      Effort: Pulmonary effort is normal. No respiratory distress.      Breath sounds: Normal breath sounds. No wheezing or rales.   Chest:      Chest wall: No tenderness.   Abdominal:      General: Bowel sounds are normal. There is no distension.      Palpations: Abdomen is soft.   Musculoskeletal:      Cervical back: Normal range of motion and neck supple.   Skin:     General: Skin is warm and dry.   Neurological:      Mental Status: He is alert and oriented to person, place, and time.   Psychiatric:         Mood and Affect: Affect normal.         Judgment: Judgment normal.         Lab Data Review:  No results found for: \"CHOLSTRLTOT\", \"LDL\", \"HDL\", \"TRIGLYCERIDE\"    Lab Results   Component Value Date/Time    SODIUM 137 06/30/2023 07:48 AM    POTASSIUM 4.8 06/30/2023 07:48 AM    CHLORIDE 102 06/30/2023 07:48 AM    CO2 27 06/30/2023 07:48 AM    GLUCOSE 98 06/30/2023 07:48 AM    BUN 13 06/30/2023 07:48 AM    CREATININE 1.06 06/30/2023 07:48 AM " "    CrCl cannot be calculated (Patient's most recent lab result is older than the maximum 7 days allowed.).  Lab Results   Component Value Date/Time    ALKPHOSPHAT 102 (H) 06/30/2023 07:48 AM    ASTSGOT 30 06/30/2023 07:48 AM    ALTSGPT 38 06/30/2023 07:48 AM    TBILIRUBIN 0.6 06/30/2023 07:48 AM      Lab Results   Component Value Date/Time    WBC 5.5 06/30/2023 07:48 AM     No results found for: \"HBA1C\"  No components found for: \"TROP\"      Cardiac Imaging and Procedures Review:      Outside EKG 6/30/23 sinus 68 bpm, variable TX interval    Medical Decision Making:  Problem List Items Addressed This Visit       Tachycardia    Relevant Orders    Cardiac Event Monitor    Family history of premature CAD    Relevant Orders    Lipid Profile    Lipoprotein (a)     Tachycardia and nonspecific chest pain - attempt reduce nicotine. Switch to patches and wean as tolerated. Consider d/c caffeine for now and if wean off nicotine resume. Check event monitor. Monitor chest pain symptoms for clearer aggravating and relieving factors. Further testing as warranted.    Family history premature cad - check lipids and lipoprotein a.     It was my pleasure to meet with Donnie Kermit.                        "

## 2023-09-26 SDOH — ECONOMIC STABILITY: TRANSPORTATION INSECURITY
IN THE PAST 12 MONTHS, HAS LACK OF TRANSPORTATION KEPT YOU FROM MEETINGS, WORK, OR FROM GETTING THINGS NEEDED FOR DAILY LIVING?: NO

## 2023-09-26 SDOH — ECONOMIC STABILITY: HOUSING INSECURITY
IN THE LAST 12 MONTHS, WAS THERE A TIME WHEN YOU DID NOT HAVE A STEADY PLACE TO SLEEP OR SLEPT IN A SHELTER (INCLUDING NOW)?: NO

## 2023-09-26 SDOH — ECONOMIC STABILITY: INCOME INSECURITY: IN THE LAST 12 MONTHS, WAS THERE A TIME WHEN YOU WERE NOT ABLE TO PAY THE MORTGAGE OR RENT ON TIME?: NO

## 2023-09-26 SDOH — HEALTH STABILITY: PHYSICAL HEALTH: ON AVERAGE, HOW MANY DAYS PER WEEK DO YOU ENGAGE IN MODERATE TO STRENUOUS EXERCISE (LIKE A BRISK WALK)?: 3 DAYS

## 2023-09-26 SDOH — ECONOMIC STABILITY: INCOME INSECURITY: HOW HARD IS IT FOR YOU TO PAY FOR THE VERY BASICS LIKE FOOD, HOUSING, MEDICAL CARE, AND HEATING?: SOMEWHAT HARD

## 2023-09-26 SDOH — ECONOMIC STABILITY: HOUSING INSECURITY: IN THE LAST 12 MONTHS, HOW MANY PLACES HAVE YOU LIVED?: 1

## 2023-09-26 SDOH — ECONOMIC STABILITY: TRANSPORTATION INSECURITY
IN THE PAST 12 MONTHS, HAS THE LACK OF TRANSPORTATION KEPT YOU FROM MEDICAL APPOINTMENTS OR FROM GETTING MEDICATIONS?: NO

## 2023-09-26 SDOH — ECONOMIC STABILITY: FOOD INSECURITY: WITHIN THE PAST 12 MONTHS, YOU WORRIED THAT YOUR FOOD WOULD RUN OUT BEFORE YOU GOT MONEY TO BUY MORE.: NEVER TRUE

## 2023-09-26 SDOH — ECONOMIC STABILITY: FOOD INSECURITY: WITHIN THE PAST 12 MONTHS, THE FOOD YOU BOUGHT JUST DIDN'T LAST AND YOU DIDN'T HAVE MONEY TO GET MORE.: NEVER TRUE

## 2023-09-26 SDOH — HEALTH STABILITY: MENTAL HEALTH
STRESS IS WHEN SOMEONE FEELS TENSE, NERVOUS, ANXIOUS, OR CAN'T SLEEP AT NIGHT BECAUSE THEIR MIND IS TROUBLED. HOW STRESSED ARE YOU?: ONLY A LITTLE

## 2023-09-26 SDOH — HEALTH STABILITY: PHYSICAL HEALTH: ON AVERAGE, HOW MANY MINUTES DO YOU ENGAGE IN EXERCISE AT THIS LEVEL?: 30 MIN

## 2023-09-26 SDOH — ECONOMIC STABILITY: TRANSPORTATION INSECURITY
IN THE PAST 12 MONTHS, HAS LACK OF RELIABLE TRANSPORTATION KEPT YOU FROM MEDICAL APPOINTMENTS, MEETINGS, WORK OR FROM GETTING THINGS NEEDED FOR DAILY LIVING?: NO

## 2023-09-26 ASSESSMENT — SOCIAL DETERMINANTS OF HEALTH (SDOH)
HOW OFTEN DO YOU GET TOGETHER WITH FRIENDS OR RELATIVES?: ONCE A WEEK
HOW OFTEN DO YOU ATTEND CHURCH OR RELIGIOUS SERVICES?: NEVER
HOW OFTEN DO YOU HAVE SIX OR MORE DRINKS ON ONE OCCASION: LESS THAN MONTHLY
WITHIN THE PAST 12 MONTHS, YOU WORRIED THAT YOUR FOOD WOULD RUN OUT BEFORE YOU GOT THE MONEY TO BUY MORE: NEVER TRUE
ARE YOU MARRIED, WIDOWED, DIVORCED, SEPARATED, NEVER MARRIED, OR LIVING WITH A PARTNER?: LIVING WITH PARTNER
HOW OFTEN DO YOU ATTEND CHURCH OR RELIGIOUS SERVICES?: NEVER
HOW OFTEN DO YOU GET TOGETHER WITH FRIENDS OR RELATIVES?: ONCE A WEEK
HOW OFTEN DO YOU HAVE A DRINK CONTAINING ALCOHOL: MONTHLY OR LESS
HOW HARD IS IT FOR YOU TO PAY FOR THE VERY BASICS LIKE FOOD, HOUSING, MEDICAL CARE, AND HEATING?: SOMEWHAT HARD
ARE YOU MARRIED, WIDOWED, DIVORCED, SEPARATED, NEVER MARRIED, OR LIVING WITH A PARTNER?: LIVING WITH PARTNER
DO YOU BELONG TO ANY CLUBS OR ORGANIZATIONS SUCH AS CHURCH GROUPS UNIONS, FRATERNAL OR ATHLETIC GROUPS, OR SCHOOL GROUPS?: NO
IN A TYPICAL WEEK, HOW MANY TIMES DO YOU TALK ON THE PHONE WITH FAMILY, FRIENDS, OR NEIGHBORS?: MORE THAN THREE TIMES A WEEK
IN A TYPICAL WEEK, HOW MANY TIMES DO YOU TALK ON THE PHONE WITH FAMILY, FRIENDS, OR NEIGHBORS?: MORE THAN THREE TIMES A WEEK
DO YOU BELONG TO ANY CLUBS OR ORGANIZATIONS SUCH AS CHURCH GROUPS UNIONS, FRATERNAL OR ATHLETIC GROUPS, OR SCHOOL GROUPS?: NO
HOW MANY DRINKS CONTAINING ALCOHOL DO YOU HAVE ON A TYPICAL DAY WHEN YOU ARE DRINKING: 3 OR 4

## 2023-09-26 ASSESSMENT — LIFESTYLE VARIABLES
HOW OFTEN DO YOU HAVE SIX OR MORE DRINKS ON ONE OCCASION: LESS THAN MONTHLY
HOW MANY STANDARD DRINKS CONTAINING ALCOHOL DO YOU HAVE ON A TYPICAL DAY: 3 OR 4
HOW OFTEN DO YOU HAVE A DRINK CONTAINING ALCOHOL: MONTHLY OR LESS
AUDIT-C TOTAL SCORE: 3
SKIP TO QUESTIONS 9-10: 0

## 2023-09-27 ENCOUNTER — OFFICE VISIT (OUTPATIENT)
Dept: MEDICAL GROUP | Facility: PHYSICIAN GROUP | Age: 28
End: 2023-09-27
Payer: COMMERCIAL

## 2023-09-27 VITALS
DIASTOLIC BLOOD PRESSURE: 84 MMHG | BODY MASS INDEX: 34.4 KG/M2 | HEIGHT: 72 IN | SYSTOLIC BLOOD PRESSURE: 120 MMHG | RESPIRATION RATE: 16 BRPM | TEMPERATURE: 98.4 F | OXYGEN SATURATION: 98 % | WEIGHT: 254 LBS | HEART RATE: 76 BPM

## 2023-09-27 DIAGNOSIS — Z00.00 ENCOUNTER FOR PREVENTATIVE ADULT HEALTH CARE EXAMINATION: ICD-10-CM

## 2023-09-27 PROCEDURE — 99395 PREV VISIT EST AGE 18-39: CPT | Performed by: NURSE PRACTITIONER

## 2023-09-27 PROCEDURE — 3079F DIAST BP 80-89 MM HG: CPT | Performed by: NURSE PRACTITIONER

## 2023-09-27 PROCEDURE — 3074F SYST BP LT 130 MM HG: CPT | Performed by: NURSE PRACTITIONER

## 2023-09-27 RX ORDER — METHYLPREDNISOLONE 4 MG/1
TABLET ORAL
COMMUNITY

## 2023-09-27 ASSESSMENT — LIFESTYLE VARIABLES
DURING THE PAST 12 MONTHS, ON HOW MANY DAYS DID YOU USE ANYTHING ELSE TO GET HIGH: 0
DURING THE PAST 12 MONTHS, ON HOW MANY DAYS DID YOU USE ANY MARIJUANA: 0
PART A TOTAL SCORE: 1
DURING THE PAST 12 MONTHS, ON HOW MANY DAYS DID YOU USE ANY TOBACCO OR NICOTINE PRODUCTS: 0
DURING THE PAST 12 MONTHS, ON HOW MANY DAYS DID YOU DRINK MORE THAN A FEW SIPS OF BEER, WINE, OR ANY DRINK CONTAINING ALCOHOL: 1

## 2023-09-27 ASSESSMENT — FIBROSIS 4 INDEX: FIB4 SCORE: 0.43

## 2023-09-27 NOTE — PROGRESS NOTES
CC:The encounter diagnosis was Encounter for preventative adult health care examination.    Agus Carmen is a 28 y.o. male here for a routine preventive health exam. Patient has no active complaints today.     Health Maintenance: Completed  - declined flu vaccine; he will check with mom in [place] to see if he got HPV vaccine there    Obesity (BMI 30-39.9)  Body mass index is 34.45 kg/m².  - he is active at work as maintenance  - he bikes with family in the summer months  - we discussed incorporating some exercise in the winter months as well for cardiovascular health    Tachycardia  HPI from 6/30/2023- Reports feeling flushed with intermittent left side chest pressure for a few months. He got a heart monitor watch some days ago which shows -140 during the day, sometimes it goes as low as 40s. Seems to feel better with activity. He went hiking on Sunday and felt great, highest HR during incline climb was in 130s. No new stressors, he does not feel anxious. Denies cough, SOB, dyspnea, dizziness, edema. No abdominal pain, N/V/D. He does not smoke cigarettes but does chew tobacco. He quit vaping marijuana months ago when symptoms started but has not noticed improvement.   BS clear on exam, no murmur. BP today 116/72, HR 80; pt does not appear in distress. He does not have symptoms at this moment.   - EKG today SR with PAC & prolonged [place] interval, rate 68; no ST changes  - check CMP, CBC, trop, UDS  - referral to see card for holter monitor, may need stress test  - adequate hydration; avoid toxic substances. He understands to report to ER if chest pain is lingering, intensifies or is accompanied with other symptoms like SOB. I will follow up with results when available  ----  - he met with Dr Carlisle/Cardiology on 8/22/2023; recommended he cut out nicotine and caffeine; states his last use was 8/22/2023; symptoms have improved; he plans to wear the farley monitor ordered for him closer to his next visit  with Dr Carlisle in November  - above labs have been okay; reminded him to get fasting lipid panel labs     Screening/Preventative Topics:  Advanced directive: none currently   Osteoporosis Screen/ DEXA: n/a; can start at age 65   Diabetes Screening: FBG 98  AAA Screening: n/a; non smoker  Aspirin Use: none    Diet: heart healthy eating, avoid highly processed foods   Exercise: recommend aerobic/strength training 3-5x/week   Screen for depression: PHQ-2: 0/0   Substance Abuse: chews nicotine  Safe in relationship   Sun protection used.    Cancer screening  Colorectal Cancer Screening: n/a/ start at age 45    Lung Cancer Screening: n/a; non smoker      Infectious disease screening  --STI Screenin2023, -ve GC/Ch,am  --Practices safe sex.  --HIV Screenin2023, -ve   --Syphilis Screenin2023, -ve   --Hepatitis C Screening: declined     Immunization History   Administered Date(s) Administered    Influenza Vaccine Quad Inj (Preserved) 2020    PFIZER PURPLE CAP SARS-COV-2 VACCINATION (12+) 2021, 10/13/2021    Tdap Vaccine 02/15/2023     Patient Active Problem List    Diagnosis Date Noted    Family history of premature CAD 2023    Tachycardia 2023    Obesity (BMI 30-39.9) 02/15/2023    Tobacco chew use 02/15/2023    Encounter to establish care 02/15/2023    Allergies 02/15/2023      Allergies:Patient has no known allergies.    Current Outpatient Medications   Medication Sig Dispense Refill    methylPREDNISolone (MEDROL) 4 MG Tab       Fexofenadine HCl (ALLEGRA ALLERGY PO) Take 1 Tablet by mouth every day.       No current facility-administered medications for this visit.       Social History     Tobacco Use    Smoking status: Never    Smokeless tobacco: Current     Types: Chew   Vaping Use    Vaping Use: Every day    Substances: Nicotine   Substance Use Topics    Alcohol use: Yes    Drug use: Not Currently     Types: Marijuana, Inhaled     Comment: occ     Social History     Social History  Narrative    Not on file       Family History   Problem Relation Age of Onset    Heart Disease Mother     Diabetes Father     Heart Disease Father        Review of Systems:    Constitutional: No Fevers, Chills  Eyes: No vision changes  ENT: No hearing changes  Resp: No Shortness of breath  CV: No Chest pain  GI: No Nausea/Vomiting  MSK: No weakness  Skin: No rashes  Neuro: No Headaches  Psych: No Suicidal ideations    All remaining systems reviewed and found to be negative, except as stated above.    Exam:    /84   Pulse 76   Temp 36.9 °C (98.4 °F) (Temporal)   Resp 16   Ht 1.829 m (6')   Wt 115 kg (254 lb)   SpO2 98%  Body mass index is 34.45 kg/m².    General:  Well nourished, well developed male in NAD  HENT: Atraumatic, normocephalic  EYES: Extraocular movements intact, pupils equal and reactive to light  NECK: Supple, FROM  CHEST: No deformities, Equal chest expansion  RESP: Unlabored, no stridor or audible wheeze  ABD: Soft, Nontender, Non-Distended  : deferred   Extremities: No Clubbing, Cyanosis, or Edema  Skin: Warm/dry, without rashes  Neuro: A/O x 4, CN 2-12 Grossly intact, Motor/sensory grossly intact  Psych: Normal behavior, normal affect    LABS: Results reviewed and discussed with the patient, questions answered.      Assessment/Plan:  1. Encounter for preventative adult health care examination        Patient up-to-date on preventative health maintenance examinations and vaccinations.  Age-appropriate anticipatory guidance provided today. Counseling about diet, supplements, exercise, skin care and safe sex.    Preventive visit in 1 year, sooner as needed for any concerns.     Please note that this dictation was created using voice recognition software. I have worked with consultants from the vendor as well as technical experts from [employer] to optimize the interface. I have made every reasonable attempt to correct obvious errors, but I expect that there are errors of grammar and  possibly content that I did not discover before finalizing the note.

## 2023-09-27 NOTE — ASSESSMENT & PLAN NOTE
HPI from 6/30/2023- Reports feeling flushed with intermittent left side chest pressure for a few months. He got a heart monitor watch some days ago which shows -140 during the day, sometimes it goes as low as 40s. Seems to feel better with activity. He went hiking on Sunday and felt great, highest HR during incline climb was in 130s. No new stressors, he does not feel anxious. Denies cough, SOB, dyspnea, dizziness, edema. No abdominal pain, N/V/D. He does not smoke cigarettes but does chew tobacco. He quit vaping marijuana months ago when symptoms started but has not noticed improvement.   BS clear on exam, no murmur. BP today 116/72, HR 80; pt does not appear in distress. He does not have symptoms at this moment.   - EKG today SR with PAC & prolonged [place] interval, rate 68; no ST changes  - check CMP, CBC, trop, UDS  - referral to see card for holter monitor, may need stress test  - adequate hydration; avoid toxic substances. He understands to report to ER if chest pain is lingering, intensifies or is accompanied with other symptoms like SOB. I will follow up with results when available  ----  - he met with Dr Carlisle/Cardiology on 8/22/2023; recommended he cut out nicotine and caffeine; states his last use was 8/22/2023; symptoms have improved; he plans to wear the farley monitor ordered for him closer to his next visit with Dr Carlisle in November  - above labs have been okay; reminded him to get fasting lipid panel labs

## 2023-09-27 NOTE — ASSESSMENT & PLAN NOTE
Body mass index is 34.45 kg/m².  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

## 2023-12-19 ENCOUNTER — TELEPHONE (OUTPATIENT)
Dept: CARDIOLOGY | Facility: MEDICAL CENTER | Age: 28
End: 2023-12-19
Payer: COMMERCIAL

## 2023-12-19 NOTE — TELEPHONE ENCOUNTER
PARTHA    Caller:  Perla Graf    Date and time of urgent report:   12/19/23  12:00  No Data Received     Reference Number: [id number]    Callback Number: [phone]    Route to Jhony CONNER and Ava NELSON    Thank you,   Mariam MINER

## 2023-12-19 NOTE — TELEPHONE ENCOUNTER
Phone number called: [phone]    Call outcome: Voicemail reached. Message left with number provided to return call.

## 2023-12-21 NOTE — TELEPHONE ENCOUNTER
Phone number called: [phone]     Call outcome: 2nd attempt - Voicemail reached. Message left with number provided to return call.       To VR: DANDRE, patient returned ordered cardiac monitor unused. Hasn't answered calls to check in.

## 2024-05-22 ENCOUNTER — DOCUMENTATION (OUTPATIENT)
Dept: HEALTH INFORMATION MANAGEMENT | Facility: OTHER | Age: 29
End: 2024-05-22
Payer: COMMERCIAL

## 2024-10-23 ENCOUNTER — TELEPHONE (OUTPATIENT)
Dept: HEALTH INFORMATION MANAGEMENT | Facility: OTHER | Age: 29
End: 2024-10-23
Payer: COMMERCIAL